# Patient Record
Sex: FEMALE | Race: WHITE | NOT HISPANIC OR LATINO | Employment: OTHER
[De-identification: names, ages, dates, MRNs, and addresses within clinical notes are randomized per-mention and may not be internally consistent; named-entity substitution may affect disease eponyms.]

---

## 2017-01-25 ENCOUNTER — RX ONLY (RX ONLY)
Age: 67
End: 2017-01-25

## 2017-01-25 RX ORDER — BIMATOPROST 0.3 MG/ML
1 DROP SOLUTION/ DROPS OPHTHALMIC NIGHTLY
Qty: 5 | Refills: 12
Start: 2017-01-25

## 2017-02-17 ENCOUNTER — APPOINTMENT (RX ONLY)
Dept: URBAN - METROPOLITAN AREA SURGERY 2 | Facility: SURGERY | Age: 67
Setting detail: DERMATOLOGY
End: 2017-02-17

## 2017-02-17 DIAGNOSIS — Z41.9 ENCOUNTER FOR PROCEDURE FOR PURPOSES OTHER THAN REMEDYING HEALTH STATE, UNSPECIFIED: ICD-10-CM

## 2017-02-17 PROCEDURE — ? FILLERS

## 2017-02-17 PROCEDURE — ? BOTOX

## 2017-02-17 NOTE — PROCEDURE: FILLERS
Additional Area 3 Volume In Cc: 0
Filler: Juvederm Ultra XC
Vermillion Lips Filler Volume In Cc: 0.2
Tear Troughs Filler Volume In Cc: 0.3
Detail Level: Simple
Expiration Date (Month Year): 12/17
Use Map Statement For Sites (Optional): No
Lot #: [id number]
Consent: Written consent obtained. Risks include but not limited to bruising, beading, irregular texture, ulceration, infection, allergic reaction, scar formation, incomplete augmentation, temporary nature, procedural pain.
Lot #: [id number]
Price $ (Numeric Only, No Text Please.): 207
Additional Area 4 Location: [facility]
Expiration Date (Month Year): 01/18
Topical Anesthesia?: yes
Additional Area 2 Location: Restorationist divot
Map Statment: See attached map for complete details
Additional Area 1 Volume In Cc: 0.1
Additional Area 1 Location: Glabellar scar
Filler: Belotero
Administered By (Optional): Liz Castrejon
Price $ (Numeric Only, No Text Please.): 375
Additional Area 1 Location: Periorbital scar

## 2017-02-17 NOTE — PROCEDURE: BOTOX
Periorbital Skin Units: 0
Postcare Instructions: Patient instructed to not lie down for 4 hours and limit physical activity for 24 hours. Patient instructed not to travel by airplane for 48 hours.\\n\\n*****
Bill Summary Price Listed Below, Or Bill Total Of Units X Price Per Unit?: Bill #Units x Price Per Unit
Detail Level: Simple
Additional Area 2 Location: Chin
Lot #: [id number]
Price Per Unit In $ (Use Numbers Only, No Text Please.): 17
Additional Area 2 Units: 5
Map Statment: See attached map for complete details
Show Price In Note?: yes
Additional Area 1 Location: Eyebrows rhytids
Additional Area 3 Location: Eyebrow
Administered By (Optional): Liz Castrejon RN
Expiration Date (Month Year): 09/19
Consent: Written consent was obtained prior to the procedure. Risks, benefits, expectations and alternatives were discussed including, but not limited to, infection, bleeding, lid/brow ptosis, bruising, swelling, diplopia, temporary effects, incomplete chemical denervation and dissatisfaction with the cosmetic outcome. No guarantee or warranty was given or implied regarding longevity of results.
Use Map Statement For Sites (Optional): No
Topical Anesthesia?: BLT cream (benzocaine 20%, lidocaine 6%, tetracaine 4%)

## 2017-05-17 ENCOUNTER — WORRISOME GROWTH - SEE NOTE (OUTPATIENT)
Dept: URBAN - METROPOLITAN AREA CLINIC 32 | Facility: CLINIC | Age: 67
Setting detail: DERMATOLOGY
End: 2017-05-17

## 2017-05-17 PROCEDURE — 99212 OFFICE O/P EST SF 10 MIN: CPT

## 2017-05-17 PROCEDURE — 11301 SHAVE SKIN LESION 0.6-1.0 CM: CPT

## 2017-08-07 ENCOUNTER — RX ONLY (RX ONLY)
Age: 67
End: 2017-08-07

## 2017-08-07 RX ORDER — CLOTRIMAZOLE AND BETAMETHASONE DIPROPIONATE 10; .5 MG/G; MG/G
ADD'L SIG ADD'L SIG CREAM TOPICAL ADD'L SIG
Qty: 45 | Refills: 0
Start: 2017-08-07

## 2018-06-21 ENCOUNTER — OTHER - SEE NOTE (OUTPATIENT)
Dept: URBAN - METROPOLITAN AREA CLINIC 34 | Facility: CLINIC | Age: 68
Setting detail: DERMATOLOGY
End: 2018-06-21

## 2018-06-21 PROCEDURE — 99213 OFFICE O/P EST LOW 20 MIN: CPT

## 2018-06-21 RX ORDER — CLOTRIMAZOLE AND BETAMETHASONE DIPROPIONATE 10; .5 MG/G; MG/G
ADD'L SIG APPLICATION CREAM TOPICAL ADD'L SIG
Qty: 45 | Refills: 11 | Status: DISCONTINUED
Start: 2018-06-21 | End: 2018-06-21

## 2018-06-21 RX ORDER — CLOTRIMAZOLE AND BETAMETHASONE DIPROPIONATE 10; .5 MG/G; MG/G
ADD'L SIG APPLICATION CREAM TOPICAL ADD'L SIG
Qty: 45 | Refills: 11
Start: 2018-06-21

## 2019-12-06 ENCOUNTER — RX ONLY (RX ONLY)
Age: 69
End: 2019-12-06

## 2019-12-06 RX ORDER — BIMATOPROST 0.3 MG/ML
1 DROP SOLUTION/ DROPS OPHTHALMIC NIGHTLY
Qty: 5 | Refills: 11 | Status: DISCONTINUED
Start: 2019-12-06 | End: 2019-12-09

## 2019-12-09 ENCOUNTER — RX ONLY (RX ONLY)
Age: 69
End: 2019-12-09

## 2019-12-09 RX ORDER — BIMATOPROST 0.3 MG/ML
1 DROP SOLUTION/ DROPS OPHTHALMIC NIGHTLY
Qty: 5 | Refills: 11
Start: 2019-12-09

## 2020-11-17 ENCOUNTER — APPOINTMENT (RX ONLY)
Dept: URBAN - METROPOLITAN AREA CLINIC 12 | Facility: CLINIC | Age: 70
Setting detail: DERMATOLOGY
End: 2020-11-17

## 2020-11-17 DIAGNOSIS — Z41.9 ENCOUNTER FOR PROCEDURE FOR PURPOSES OTHER THAN REMEDYING HEALTH STATE, UNSPECIFIED: ICD-10-CM

## 2020-11-17 PROCEDURE — ? BOTOX

## 2020-11-17 PROCEDURE — ? FILLERS

## 2020-11-17 NOTE — PROCEDURE: FILLERS
Additional Area 1 Location: perioral lines
Topical Anesthetic #2: tetracaine
Lateral Canthal Filler Volume In Cc: 0
Map Statment: See attached map for complete details
Filler: Restylane Defyne
Lot #: [id number]
Lot #: [id number]
Additional Area 5 Location: oral commissures
Additional Area 2 Location: perioral  lines
Additional Area 2 Volume In Cc: 0.1
Administered By (Optional): Liz Castrejon RN
Lot #: [id number]
Additional Area 2 Location: glabellar lines and forehead lines
Topical % #1: 23
Topical Anesthesia?: yes
Additional Area 1 Location: perioral shadows
Topical Anesthetic #1: lidocaine
Expiration Date (Month Year): 03/2022
Additional Area 4 Location: depression above right brow
Expiration Date (Month Year): 06/2021
Additional Area 3 Location: chin
Filler: Belotero
Price $ (Numeric Only, No Text Please.): 001
Price $ (Numeric Only, No Text Please.): 360
Filler: Konstantin Najera (Perlane-L)
Expiration Date (Month Year): 02/2023
Consent: Written consent obtained. Risks include but not limited to bruising, beading, irregular texture, ulceration, infection, allergic reaction, scar formation, incomplete augmentation, temporary nature, procedural pain.
Topical % #2: 7
Additional Area 5 Location: forehead depression
Topical Anesthetic Base: ointment
Additional Area 4 Location: cheek depressions
Additional Area 1 Location: glabellar lines
Detail Level: Simple
Tear Troughs Filler Volume In Cc: 0.2
Price $ (Numeric Only, No Text Please.): 104

## 2020-11-17 NOTE — PROCEDURE: BOTOX
Bill Summary Price Listed Below, Or Bill Total Of Units X Price Per Unit?: Bill #Units x Price Per Unit
Periorbital Skin Units: 0
Use Map Statement For Sites (Optional): Yes
Additional Area 2 Units: 2.5
Detail Level: Simple
Dilution (U/0.1 Cc): 5
Additional Area 2 Location: Chin
Additional Area 1 Units: 12.5
Postcare Instructions: Patient instructed to not lie down for 4 hours and limit physical activity for 24 hours. Patient instructed not to travel by airplane for 48 hours.
Expiration Date (Month Year): 08/2023
Price Per Unit In $ (Use Numbers Only, No Text Please.): 17
Administered By (Optional): Liz Castrejon
Additional Area 3 Location: tail end of brow
Map Statment: See attached map for complete details
Additional Area 1 Location: lateral platysma bands
Lot #: [id number]
Consent: Written consent was obtained prior to the procedure. Risks, benefits, expectations and alternatives were discussed including, but not limited to, infection, bleeding, lid/brow ptosis, bruising, swelling, diplopia, temporary effects, incomplete chemical denervation and dissatisfaction with the cosmetic outcome. No guarantee or warranty was given or implied regarding longevity of results.

## 2021-05-03 ENCOUNTER — APPOINTMENT (RX ONLY)
Dept: URBAN - METROPOLITAN AREA CLINIC 12 | Facility: CLINIC | Age: 71
Setting detail: DERMATOLOGY
End: 2021-05-03

## 2021-05-03 DIAGNOSIS — Z41.9 ENCOUNTER FOR PROCEDURE FOR PURPOSES OTHER THAN REMEDYING HEALTH STATE, UNSPECIFIED: ICD-10-CM

## 2021-05-03 PROCEDURE — ? JUVEDERM ULTRA PLUS XC INJECTION

## 2021-05-03 NOTE — PROCEDURE: JUVEDERM ULTRA PLUS XC INJECTION
Additional Anesthesia Volume In Cc: 0
Post-Care Instructions: Patient instructed to apply ice to reduce swelling.
Expiration Date (Month Year): 06/2022
Marionette Lines Filler Volume In Cc: 0.5
Price (Use Numbers Only, No Special Characters Or $): 395
Number Of Syringes (Required For Inventory): 1
Lot #: [id number]
Map Statment: See Attach Map for Complete Details
Filler: Juvederm Ultra Plus XC
Detail Level: Detailed
Use Map Statement For Sites (Optional): No
Consent: Written consent obtained. Risks include but not limited to bruising, beading, irregular texture, ulceration, infection, allergic reaction, scar formation, incomplete augmentation, temporary nature, procedural pain.
Procedural Text: The filler was administered to the treatment areas noted above.

## 2021-09-23 ENCOUNTER — OFFICE VISIT (OUTPATIENT)
Dept: URBAN - METROPOLITAN AREA CLINIC 12 | Facility: CLINIC | Age: 71
End: 2021-09-23
Payer: MEDICARE

## 2021-09-23 VITALS
WEIGHT: 192.2 LBS | HEIGHT: 69 IN | SYSTOLIC BLOOD PRESSURE: 121 MMHG | TEMPERATURE: 96.8 F | BODY MASS INDEX: 28.47 KG/M2 | DIASTOLIC BLOOD PRESSURE: 80 MMHG | HEART RATE: 69 BPM

## 2021-09-23 DIAGNOSIS — Z12.11 COLON CANCER SCREENING: ICD-10-CM

## 2021-09-23 PROCEDURE — 99202 OFFICE O/P NEW SF 15 MIN: CPT | Performed by: STUDENT IN AN ORGANIZED HEALTH CARE EDUCATION/TRAINING PROGRAM

## 2021-09-23 RX ORDER — ERENUMAB-AOOE 70 MG/ML
AS DIRECTED INJECTION SUBCUTANEOUS
Status: ACTIVE | COMMUNITY

## 2021-09-23 NOTE — HPI-TODAY'S VISIT:
71 yo F here for evaluation prior to colonoscopy.  Last colonoscopy in 2018 with Dr. Love with 2 small sessile polyps removed with cold forceps. Recommended for repeat colonoscopy in 3-5 years. Pt is asymptomatic in terms of lower GI symptoms. No FH of CRC  She would like to know if she can wait another 2 years for her colonoscopy.

## 2021-12-20 ENCOUNTER — TELEPHONE ENCOUNTER (OUTPATIENT)
Dept: URBAN - METROPOLITAN AREA CLINIC 23 | Facility: CLINIC | Age: 71
End: 2021-12-20

## 2023-02-27 ENCOUNTER — TELEPHONE ENCOUNTER (OUTPATIENT)
Dept: URBAN - METROPOLITAN AREA CLINIC 12 | Facility: CLINIC | Age: 73
End: 2023-02-27

## 2023-04-19 ENCOUNTER — WEB ENCOUNTER (OUTPATIENT)
Dept: URBAN - METROPOLITAN AREA CLINIC 12 | Facility: CLINIC | Age: 73
End: 2023-04-19

## 2023-04-19 ENCOUNTER — OFFICE VISIT (OUTPATIENT)
Dept: URBAN - METROPOLITAN AREA CLINIC 12 | Facility: CLINIC | Age: 73
End: 2023-04-19
Payer: MEDICARE

## 2023-04-19 ENCOUNTER — DASHBOARD ENCOUNTERS (OUTPATIENT)
Age: 73
End: 2023-04-19

## 2023-04-19 VITALS
DIASTOLIC BLOOD PRESSURE: 75 MMHG | HEART RATE: 71 BPM | SYSTOLIC BLOOD PRESSURE: 119 MMHG | BODY MASS INDEX: 29.55 KG/M2 | HEIGHT: 68 IN | WEIGHT: 195 LBS | TEMPERATURE: 97.5 F

## 2023-04-19 DIAGNOSIS — Z86.010 PERSONAL HISTORY OF COLONIC POLYPS: ICD-10-CM

## 2023-04-19 DIAGNOSIS — K57.92 DIVERTICULITIS: ICD-10-CM

## 2023-04-19 PROBLEM — 428283002: Status: ACTIVE | Noted: 2023-04-19

## 2023-04-19 PROCEDURE — 99244 OFF/OP CNSLTJ NEW/EST MOD 40: CPT | Performed by: STUDENT IN AN ORGANIZED HEALTH CARE EDUCATION/TRAINING PROGRAM

## 2023-04-19 PROCEDURE — 99214 OFFICE O/P EST MOD 30 MIN: CPT | Performed by: STUDENT IN AN ORGANIZED HEALTH CARE EDUCATION/TRAINING PROGRAM

## 2023-04-19 RX ORDER — POLYETHYLENE GLYCOL 3350, SODIUM SULFATE, SODIUM CHLORIDE, POTASSIUM CHLORIDE, ASCORBIC ACID, SODIUM ASCORBATE 140-9-5.2G
AS DIRECTED KIT ORAL ONCE
Qty: 1 KIT | Refills: 0 | OUTPATIENT
Start: 2023-04-19 | End: 2023-04-20

## 2023-04-19 RX ORDER — ERENUMAB-AOOE 70 MG/ML
AS DIRECTED INJECTION SUBCUTANEOUS
Status: DISCONTINUED | COMMUNITY

## 2023-04-19 NOTE — HPI-TODAY'S VISIT:
This patient was referred for evaluation by Dr. Thapa for colonoscopy. A copy of this note will be sent to referring provider. 71 yo F here prior to colonoscopy. Last one in 2018 -- 2 small hyperplastic polyps. Says previous colonoscopy she had a precancerous polyp appx 2013. She says she had a bout of diverticulitis 2-3 months ago. It was confirmed with CT scan, and given antibiotics for 10 days. She denies constipation now, but says she previously had it. Has a BM every day.

## 2023-05-04 PROBLEM — 307496006: Status: ACTIVE | Noted: 2023-04-19

## 2023-05-30 ENCOUNTER — OFFICE VISIT (OUTPATIENT)
Dept: URBAN - METROPOLITAN AREA SURGERY CENTER 15 | Facility: SURGERY CENTER | Age: 73
End: 2023-05-30

## 2023-06-26 ENCOUNTER — OFFICE VISIT (OUTPATIENT)
Dept: URBAN - METROPOLITAN AREA SURGERY CENTER 15 | Facility: SURGERY CENTER | Age: 73
End: 2023-06-26
Payer: MEDICARE

## 2023-06-26 ENCOUNTER — CLAIMS CREATED FROM THE CLAIM WINDOW (OUTPATIENT)
Dept: URBAN - METROPOLITAN AREA CLINIC 4 | Facility: CLINIC | Age: 73
End: 2023-06-26
Payer: MEDICARE

## 2023-06-26 DIAGNOSIS — Z09 ENCNTR FOR F/U EXAM AFT TRTMT FOR COND OTH THAN MALIG NEOPLM: ICD-10-CM

## 2023-06-26 DIAGNOSIS — K63.5 BENIGN COLON POLYP: ICD-10-CM

## 2023-06-26 DIAGNOSIS — K63.89 OTHER SPECIFIED DISEASES OF INTESTINE: ICD-10-CM

## 2023-06-26 DIAGNOSIS — Z86.010 PERSONAL HISTORY OF COLONIC POLYPS: ICD-10-CM

## 2023-06-26 PROCEDURE — 88305 TISSUE EXAM BY PATHOLOGIST: CPT | Performed by: PATHOLOGY

## 2023-06-26 PROCEDURE — 45385 COLONOSCOPY W/LESION REMOVAL: CPT | Performed by: INTERNAL MEDICINE

## 2023-06-26 PROCEDURE — G8907 PT DOC NO EVENTS ON DISCHARG: HCPCS | Performed by: INTERNAL MEDICINE

## 2023-11-06 ENCOUNTER — TELEPHONE ENCOUNTER (OUTPATIENT)
Dept: URBAN - METROPOLITAN AREA CLINIC 12 | Facility: CLINIC | Age: 73
End: 2023-11-06

## 2023-11-06 RX ORDER — AMOXICILLIN AND CLAVULANATE POTASSIUM 875; 125 MG/1; MG/1
1 TABLET TABLET, FILM COATED ORAL
Qty: 20 | Refills: 0 | OUTPATIENT
Start: 2023-11-06 | End: 2023-11-16

## 2023-11-07 ENCOUNTER — WEB ENCOUNTER (OUTPATIENT)
Dept: URBAN - METROPOLITAN AREA CLINIC 12 | Facility: CLINIC | Age: 73
End: 2023-11-07

## 2023-11-09 ENCOUNTER — WEB ENCOUNTER (OUTPATIENT)
Dept: URBAN - METROPOLITAN AREA CLINIC 12 | Facility: CLINIC | Age: 73
End: 2023-11-09

## 2023-11-09 RX ORDER — METRONIDAZOLE 250 MG/1
1 TABLET TABLET ORAL THREE TIMES A DAY
Qty: 30 | Refills: 0 | OUTPATIENT
Start: 2023-11-11 | End: 2023-11-21

## 2023-11-09 RX ORDER — CIPROFLOXACIN 500 MG/1
1 TABLET TABLET, FILM COATED ORAL
Qty: 20 TABLET | Refills: 0 | OUTPATIENT
Start: 2023-11-11 | End: 2023-11-21

## 2023-11-21 ENCOUNTER — WEB ENCOUNTER (OUTPATIENT)
Dept: URBAN - METROPOLITAN AREA CLINIC 12 | Facility: CLINIC | Age: 73
End: 2023-11-21

## 2024-01-11 ENCOUNTER — OFFICE VISIT (OUTPATIENT)
Dept: URBAN - METROPOLITAN AREA CLINIC 111 | Facility: CLINIC | Age: 74
End: 2024-01-11

## 2024-08-10 ENCOUNTER — TELEPHONE ENCOUNTER (OUTPATIENT)
Dept: URBAN - METROPOLITAN AREA CLINIC 23 | Facility: CLINIC | Age: 74
End: 2024-08-10

## 2024-08-10 RX ORDER — METRONIDAZOLE 500 MG/1
1 TABLET TABLET ORAL
Qty: 14 TABLET | Refills: 0 | OUTPATIENT
Start: 2024-08-10 | End: 2024-08-17

## 2024-08-29 ENCOUNTER — TELEPHONE ENCOUNTER (OUTPATIENT)
Dept: URBAN - METROPOLITAN AREA CLINIC 12 | Facility: CLINIC | Age: 74
End: 2024-08-29

## 2024-08-29 RX ORDER — METRONIDAZOLE 500 MG/1
1 TABLET TABLET ORAL
OUTPATIENT
Start: 2024-08-10

## 2024-10-09 ENCOUNTER — OFFICE VISIT (OUTPATIENT)
Dept: URBAN - METROPOLITAN AREA CLINIC 12 | Facility: CLINIC | Age: 74
End: 2024-10-09
Payer: MEDICARE

## 2024-10-09 VITALS
HEIGHT: 68 IN | BODY MASS INDEX: 32.83 KG/M2 | SYSTOLIC BLOOD PRESSURE: 110 MMHG | DIASTOLIC BLOOD PRESSURE: 74 MMHG | TEMPERATURE: 97.7 F | WEIGHT: 216.6 LBS | HEART RATE: 80 BPM

## 2024-10-09 DIAGNOSIS — K57.30 ACQUIRED DIVERTICULOSIS OF COLON: ICD-10-CM

## 2024-10-09 DIAGNOSIS — R15.2 FECAL URGENCY: ICD-10-CM

## 2024-10-09 PROBLEM — 397881000: Status: ACTIVE | Noted: 2024-10-09

## 2024-10-09 PROCEDURE — 99214 OFFICE O/P EST MOD 30 MIN: CPT | Performed by: STUDENT IN AN ORGANIZED HEALTH CARE EDUCATION/TRAINING PROGRAM

## 2024-10-09 RX ORDER — METRONIDAZOLE 500 MG/1
1 TABLET TABLET ORAL
Status: ON HOLD | COMMUNITY
Start: 2024-08-10

## 2024-10-09 RX ORDER — METRONIDAZOLE 250 MG/1
1 TABLET TABLET ORAL THREE TIMES A DAY
Qty: 30 TABLET | Refills: 1 | OUTPATIENT
Start: 2024-10-09 | End: 2024-10-29

## 2024-10-09 NOTE — HPI-TODAY'S VISIT:
72 yo F here for evaluation. She had a flare in August of diverticulitis Took metronidazole for 10 days, twice a day.  Feels better since then Wants a refill Rx.  She has had 3 episodes in 2 years.  Complains of diarrhea and urgency after eating